# Patient Record
Sex: FEMALE | Race: WHITE | NOT HISPANIC OR LATINO | Employment: FULL TIME | URBAN - METROPOLITAN AREA
[De-identification: names, ages, dates, MRNs, and addresses within clinical notes are randomized per-mention and may not be internally consistent; named-entity substitution may affect disease eponyms.]

---

## 2017-05-25 ENCOUNTER — TRANSCRIBE ORDERS (OUTPATIENT)
Dept: ADMINISTRATIVE | Facility: HOSPITAL | Age: 52
End: 2017-05-25

## 2017-05-25 DIAGNOSIS — Z12.31 VISIT FOR SCREENING MAMMOGRAM: Primary | ICD-10-CM

## 2017-06-05 ENCOUNTER — HOSPITAL ENCOUNTER (OUTPATIENT)
Dept: MAMMOGRAPHY | Facility: HOSPITAL | Age: 52
Discharge: HOME | End: 2017-06-05
Attending: OBSTETRICS & GYNECOLOGY | Admitting: OBSTETRICS & GYNECOLOGY

## 2017-06-05 ENCOUNTER — APPOINTMENT (OUTPATIENT)
Dept: OTHER | Facility: HOSPITAL | Age: 52
End: 2017-06-05
Attending: OBSTETRICS & GYNECOLOGY

## 2017-06-05 DIAGNOSIS — Z92.89 H/O MAMMOGRAM: ICD-10-CM

## 2017-06-05 DIAGNOSIS — Z12.31 VISIT FOR SCREENING MAMMOGRAM: ICD-10-CM

## 2017-06-05 PROCEDURE — 77067 SCR MAMMO BI INCL CAD: CPT | Performed by: RADIOLOGY

## 2017-06-05 PROCEDURE — 77063 BREAST TOMOSYNTHESIS BI: CPT

## 2017-06-05 PROCEDURE — 77063 BREAST TOMOSYNTHESIS BI: CPT | Performed by: RADIOLOGY

## 2017-06-05 PROCEDURE — G0202 SCR MAMMO BI INCL CAD: HCPCS

## 2019-09-16 ENCOUNTER — HOSPITAL ENCOUNTER (EMERGENCY)
Facility: HOSPITAL | Age: 54
Discharge: HOME | End: 2019-09-16
Attending: EMERGENCY MEDICINE | Admitting: EMERGENCY MEDICINE

## 2019-09-16 VITALS
RESPIRATION RATE: 18 BRPM | SYSTOLIC BLOOD PRESSURE: 143 MMHG | DIASTOLIC BLOOD PRESSURE: 79 MMHG | HEIGHT: 66 IN | BODY MASS INDEX: 28.9 KG/M2 | TEMPERATURE: 98.2 F | OXYGEN SATURATION: 96 % | WEIGHT: 179.8 LBS | HEART RATE: 76 BPM

## 2019-09-16 DIAGNOSIS — S91.119A LACERATION OF TOE OF RIGHT FOOT WITHOUT FOREIGN BODY PRESENT OR DAMAGE TO NAIL, UNSPECIFIED TOE, INITIAL ENCOUNTER: Primary | ICD-10-CM

## 2019-09-16 PROCEDURE — 99282 EMERGENCY DEPT VISIT SF MDM: CPT

## 2021-01-15 ENCOUNTER — OFFICE VISIT (OUTPATIENT)
Dept: INTERNAL MEDICINE | Facility: CLINIC | Age: 56
End: 2021-01-15

## 2021-01-15 VITALS
TEMPERATURE: 97.8 F | HEART RATE: 72 BPM | HEIGHT: 66 IN | DIASTOLIC BLOOD PRESSURE: 72 MMHG | SYSTOLIC BLOOD PRESSURE: 118 MMHG | WEIGHT: 185.8 LBS | OXYGEN SATURATION: 100 % | BODY MASS INDEX: 29.86 KG/M2

## 2021-01-15 DIAGNOSIS — Z13.29 SCREENING FOR ENDOCRINE, METABOLIC AND IMMUNITY DISORDER: ICD-10-CM

## 2021-01-15 DIAGNOSIS — E66.09 CLASS 1 OBESITY DUE TO EXCESS CALORIES WITHOUT SERIOUS COMORBIDITY WITH BODY MASS INDEX (BMI) OF 30.0 TO 30.9 IN ADULT: ICD-10-CM

## 2021-01-15 DIAGNOSIS — Z13.0 SCREENING FOR ENDOCRINE, METABOLIC AND IMMUNITY DISORDER: ICD-10-CM

## 2021-01-15 DIAGNOSIS — Z00.00 ANNUAL PHYSICAL EXAM: ICD-10-CM

## 2021-01-15 DIAGNOSIS — Z13.228 SCREENING FOR ENDOCRINE, METABOLIC AND IMMUNITY DISORDER: ICD-10-CM

## 2021-01-15 DIAGNOSIS — Z11.59 ENCOUNTER FOR HEPATITIS C VIRUS SCREENING TEST FOR HIGH RISK PATIENT: ICD-10-CM

## 2021-01-15 DIAGNOSIS — M54.41 CHRONIC BILATERAL LOW BACK PAIN WITH RIGHT-SIDED SCIATICA: ICD-10-CM

## 2021-01-15 DIAGNOSIS — E78.5 HYPERLIPIDEMIA, UNSPECIFIED HYPERLIPIDEMIA TYPE: ICD-10-CM

## 2021-01-15 DIAGNOSIS — Z13.0 SCREENING FOR DISORDER OF BLOOD AND BLOOD-FORMING ORGANS: ICD-10-CM

## 2021-01-15 DIAGNOSIS — Z76.89 ENCOUNTER TO ESTABLISH CARE: Primary | ICD-10-CM

## 2021-01-15 DIAGNOSIS — G89.29 CHRONIC BILATERAL LOW BACK PAIN WITH RIGHT-SIDED SCIATICA: ICD-10-CM

## 2021-01-15 DIAGNOSIS — D68.59 PROTEIN C DEFICIENCY (HCC): ICD-10-CM

## 2021-01-15 DIAGNOSIS — Z91.89 ENCOUNTER FOR HEPATITIS C VIRUS SCREENING TEST FOR HIGH RISK PATIENT: ICD-10-CM

## 2021-01-15 PROCEDURE — 99386 PREV VISIT NEW AGE 40-64: CPT | Performed by: NURSE PRACTITIONER

## 2021-01-15 RX ORDER — IBUPROFEN 600 MG/1
600 TABLET ORAL EVERY 6 HOURS PRN
Qty: 90 TABLET | Refills: 1 | Status: SHIPPED | OUTPATIENT
Start: 2021-01-15 | End: 2021-01-15 | Stop reason: SDUPTHER

## 2021-01-15 RX ORDER — IBUPROFEN 600 MG/1
600 TABLET ORAL EVERY 6 HOURS PRN
Qty: 90 TABLET | Refills: 1 | OUTPATIENT
Start: 2021-01-15 | End: 2022-04-28

## 2021-01-16 LAB
ALBUMIN SERPL-MCNC: 4.3 G/DL (ref 3.5–5.2)
ALBUMIN/GLOB SERPL: 1.9 G/DL
ALP SERPL-CCNC: 46 U/L (ref 39–117)
ALT SERPL-CCNC: 28 U/L (ref 1–33)
AST SERPL-CCNC: 24 U/L (ref 1–32)
BASOPHILS # BLD AUTO: 0.02 10*3/MM3 (ref 0–0.2)
BASOPHILS NFR BLD AUTO: 0.5 % (ref 0–1.5)
BILIRUB SERPL-MCNC: 0.5 MG/DL (ref 0–1.2)
BUN SERPL-MCNC: 13 MG/DL (ref 6–20)
BUN/CREAT SERPL: 18.1 (ref 7–25)
CALCIUM SERPL-MCNC: 9.2 MG/DL (ref 8.6–10.5)
CHLORIDE SERPL-SCNC: 101 MMOL/L (ref 98–107)
CHOLEST SERPL-MCNC: 282 MG/DL (ref 0–200)
CO2 SERPL-SCNC: 28.8 MMOL/L (ref 22–29)
CREAT SERPL-MCNC: 0.72 MG/DL (ref 0.57–1)
EOSINOPHIL # BLD AUTO: 0.07 10*3/MM3 (ref 0–0.4)
EOSINOPHIL NFR BLD AUTO: 1.9 % (ref 0.3–6.2)
ERYTHROCYTE [DISTWIDTH] IN BLOOD BY AUTOMATED COUNT: 12.4 % (ref 12.3–15.4)
GLOBULIN SER CALC-MCNC: 2.3 GM/DL
GLUCOSE SERPL-MCNC: 86 MG/DL (ref 65–99)
HCT VFR BLD AUTO: 42.9 % (ref 34–46.6)
HCV AB S/CO SERPL IA: <0.1 S/CO RATIO (ref 0–0.9)
HDLC SERPL-MCNC: 83 MG/DL (ref 40–60)
HGB BLD-MCNC: 14.6 G/DL (ref 12–15.9)
IMM GRANULOCYTES # BLD AUTO: 0.01 10*3/MM3 (ref 0–0.05)
IMM GRANULOCYTES NFR BLD AUTO: 0.3 % (ref 0–0.5)
LDLC SERPL CALC-MCNC: 186 MG/DL (ref 0–100)
LYMPHOCYTES # BLD AUTO: 1.25 10*3/MM3 (ref 0.7–3.1)
LYMPHOCYTES NFR BLD AUTO: 33.3 % (ref 19.6–45.3)
MCH RBC QN AUTO: 31.5 PG (ref 26.6–33)
MCHC RBC AUTO-ENTMCNC: 34 G/DL (ref 31.5–35.7)
MCV RBC AUTO: 92.5 FL (ref 79–97)
MONOCYTES # BLD AUTO: 0.38 10*3/MM3 (ref 0.1–0.9)
MONOCYTES NFR BLD AUTO: 10.1 % (ref 5–12)
NEUTROPHILS # BLD AUTO: 2.02 10*3/MM3 (ref 1.7–7)
NEUTROPHILS NFR BLD AUTO: 53.9 % (ref 42.7–76)
NRBC BLD AUTO-RTO: 0 /100 WBC (ref 0–0.2)
PLATELET # BLD AUTO: 240 10*3/MM3 (ref 140–450)
POTASSIUM SERPL-SCNC: 4.7 MMOL/L (ref 3.5–5.2)
PROT SERPL-MCNC: 6.6 G/DL (ref 6–8.5)
RBC # BLD AUTO: 4.64 10*6/MM3 (ref 3.77–5.28)
SODIUM SERPL-SCNC: 139 MMOL/L (ref 136–145)
TRIGL SERPL-MCNC: 79 MG/DL (ref 0–150)
TSH SERPL DL<=0.005 MIU/L-ACNC: 1.84 UIU/ML (ref 0.27–4.2)
VLDLC SERPL CALC-MCNC: 13 MG/DL (ref 5–40)
WBC # BLD AUTO: 3.75 10*3/MM3 (ref 3.4–10.8)

## 2021-01-16 NOTE — PROGRESS NOTES
Please inform patient labs are normal, with exception of lipid panel.  Her 10-year ASCVD risk score  is: 1.8%.  Medication therapy is usually initiated when the score is over 7.  She should eat a low cholesterol, low fat diet.  Be physically active most days of the week we discussed.

## 2021-01-19 ENCOUNTER — TELEPHONE (OUTPATIENT)
Dept: INTERNAL MEDICINE | Facility: CLINIC | Age: 56
End: 2021-01-19

## 2021-01-19 DIAGNOSIS — R23.9 UNSPECIFIED SKIN CHANGES: Primary | ICD-10-CM

## 2021-01-19 NOTE — TELEPHONE ENCOUNTER
Caller: Angie Becker    Relationship: Self    Best call back number: [phone]    What orders are you requesting (i.e. lab or imaging): REFERRAL TO A [facility] DERMATOLOGIST IN [place]    In what timeframe would the patient need to come in:     Where will you receive your lab/imaging services:     Additional notes: Patient stated that she had a dermatologist but he was not with the [facility] and she prefers to stay in the [facility] in [place].

## 2021-01-19 NOTE — TELEPHONE ENCOUNTER
Patient called back and stated that she would like to be referred to a dermatologist in [place].    Patient callback: [phone]    Please advise

## 2021-01-19 NOTE — TELEPHONE ENCOUNTER
Called, left voicemail that we do not have [facility] in [place] or [place].  We do have affiliated providers. She will contact clinic if she wants to be referred to someone in [place].

## 2021-03-11 NOTE — PROGRESS NOTES
Date: 01/15/2021    Name: Angie Becker  : 1965    Chief Complaint:   Chief Complaint   Patient presents with   • [facility]     physical       HPI:  Angie Becker is a 55 y.o. female presents to establish care, obtain annual physical & labs for work. She has a h/o hyperlipids, does not currently take medication.  Tries to eat a heart healthy diet.  Walks, uses home workout equipment 4 days a week.    Takes tylenol pm at night.  Wakes up with headache, when she doesn't take it.  Feels it is due to sinuses, no allergies.  Has chronic lower back pain that radiates into posterior right buttock and thigh.  Tylenol PM helps with lower back pain in the mornings as well.     has recently been diagnosed with stage IV prostate cancer which has metastasized.    She reports she had a heart attack when she was given epinephrine in the past.  Does not currently see cardiology, does not take medication for coronary artery disease.  She has a history of protein C deficiency.     History:  LMP: No LMP recorded. Patient is postmenopausal.  Menopause at 50 years  Last pap date: 20, she sees gynecology  Abnormal pap? no  : 4  Para: 3    Do you take any herbs or supplements that were not prescribed by a doctor? yes, vitamin D, vitamin C, zinc      Health Habits:  Dental Exam. not up to date - not comfortable scheduling dental exam at this time due to COVID  Eye Exam. up to date, wears glasses  Exercise: 4 times/week.  Current exercise activities include: walking and stationary bike     History:    Past Medical History:   Diagnosis Date   • Myocardial infarction (CMS/HCC)        Past Surgical History:   Procedure Laterality Date   •  SECTION     • VASCULAR SURGERY         Family History   Problem Relation Age of Onset   • Diabetes Mother    • Cancer Mother         uterine   • Hypertension Mother    • Diabetes Father    • Heart attack Father    • Stroke Father        Social History  "    Socioeconomic History   • Marital status:      Spouse name: Not on file   • Number of children: Not on file   • Years of education: Not on file   • Highest education level: Not on file   Tobacco Use   • Smoking status: Never Smoker   • Smokeless tobacco: Never Used   Substance and Sexual Activity   • Alcohol use: Yes     Comment: 1-2 times weekly   • Drug use: No   • Sexual activity: Defer       Allergies   Allergen Reactions   • Epinephrine Other (See Comments)     \"heart attack\"         Current Outpatient Medications:   •  ibuprofen (ADVIL,MOTRIN) 600 MG tablet, Take 1 tablet by mouth Every 6 (Six) Hours As Needed for Mild Pain ., Disp: 90 tablet, Rfl: 1      VS:  Vitals:    01/15/21 0907   BP: 118/72   Pulse: 72   Temp: 97.8 °F (36.6 °C)   TempSrc: Infrared   SpO2: 100%   Weight: 84.3 kg (185 lb 12.8 oz)   Height: 167.6 cm (66\")     Body mass index is 29.99 kg/m².    PE:  Physical Exam  Constitutional:       Appearance: She is well-developed. She is not ill-appearing.   HENT:      Head: Normocephalic.      Right Ear: Tympanic membrane, ear canal and external ear normal.      Left Ear: Tympanic membrane, ear canal and external ear normal.      Nose: Nose normal.      Mouth/Throat:      Mouth: Mucous membranes are moist.      Pharynx: Oropharynx is clear. Uvula midline.   Eyes:      Extraocular Movements: Extraocular movements intact.      Conjunctiva/sclera: Conjunctivae normal.      Pupils: Pupils are equal, round, and reactive to light.   Neck:      Musculoskeletal: Full passive range of motion without pain, normal range of motion and neck supple.      Thyroid: No thyromegaly.      Vascular: No carotid bruit.   Cardiovascular:      Rate and Rhythm: Normal rate and regular rhythm.      Pulses: Normal pulses.      Heart sounds: Normal heart sounds.   Pulmonary:      Effort: Pulmonary effort is normal.      Breath sounds: Normal breath sounds.   Abdominal:      General: Bowel sounds are normal.      " Palpations: Abdomen is soft.      Tenderness: There is no abdominal tenderness.   Musculoskeletal: Normal range of motion.         General: No tenderness or deformity.   Lymphadenopathy:      Cervical: No cervical adenopathy.   Skin:     General: Skin is warm.      Capillary Refill: Capillary refill takes less than 2 seconds.   Neurological:      Mental Status: She is alert and oriented to person, place, and time.      Sensory: No sensory deficit.      Coordination: Coordination normal.      Gait: Gait normal.      Comments: CN II-XII normal   Psychiatric:         Attention and Perception: Attention normal.         Mood and Affect: Mood and affect normal.         Speech: Speech normal.         Behavior: Behavior normal.         Thought Content: Thought content normal.         Assessment/Plan:     1. Healthy female exam.    2. Patient Counseling: Including but not Limited to the following, when appropriate:  --Nutrition: Stressed importance of moderation in sodium/caffeine intake, saturated fat and cholesterol, caloric balance, sufficient intake of fresh fruits, vegetables, fiber, calcium, iron, and 1 mg of folate supplement per day (for females capable of pregnancy).  --Exercise: Stressed the importance of regular exercise.   --Substance Abuse: Discussed cessation/primary prevention of tobacco, alcohol, or other drug use; driving or other dangerous activities under the influence; availability of treatment for abuse, as indicated based on social history.    --Sexuality: Discussed sexually transmitted diseases, partner selection, use of condoms, avoidance of unintended pregnancy  and contraceptive alternatives.   --Injury prevention: Discussed safety belts, safety helmets, smoke detector, smoking near bedding or upholstery.   --Dental health: Discussed importance of regular tooth brushing, flossing, and dental visits.  --Immunizations reviewed.  --Discussed benefits of colon cancer screening.  3. Discussed the  patient's BMI with her.  The BMI is above average; BMI management plan is completed  4. Return in about 1 year (around 1/15/2022) for Annual.  5. Age-appropriate Screening Scheduled  6. There are no Patient Instructions on file for this visit.    Diagnoses and all orders for this visit:    1. Encounter to establish care (Primary)    2. Annual physical exam    3. Protein C deficiency (CMS/HCC)    4. Chronic bilateral low back pain with right-sided sciatica  -     Ambulatory Referral to Physical Therapy Evaluate and treat; Heat; Moist heat, Ultrasound; Soft Tissue Mobilizaton; Stretching, Strengthening; Full weight bearing  -     ibuprofen (ADVIL,MOTRIN) 600 MG tablet; Take 1 tablet by mouth Every 6 (Six) Hours As Needed for Mild Pain .  Dispense: 90 tablet; Refill: 1  - Stretch before and after workouts.    5. Hyperlipidemia, unspecified hyperlipidemia type  -     Lipid Panel  - Heart healthy diet, discussed Mediterranean diet specifically    6. Screening for disorder of blood and blood-forming organs  -     Comprehensive Metabolic Panel    7. Screening for endocrine, metabolic and immunity disorder  -     CBC & Differential  -     TSH    8. Encounter for hepatitis C virus screening test for high risk patient  -     Hepatitis C Antibody    9. Class 1 obesity due to excess calories without serious comorbidity with body mass index (BMI) of 30.0 to 30.9 in adult        - Patient's Body mass index is 29.99 kg/m². BMI is above normal parameters. Recommendations include: exercise counseling and nutrition counseling.    Discussed 's prognosis.  She feels she and her  are dealing with it well.  Will reach out, return to clinic should she feel she is becoming depressed or anxious.    Return in about 1 year (around 1/15/2022) for Annual.                   yes

## 2021-03-19 ENCOUNTER — TREATMENT (OUTPATIENT)
Dept: PHYSICAL THERAPY | Facility: CLINIC | Age: 56
End: 2021-03-19

## 2021-03-19 DIAGNOSIS — S39.012S STRAIN OF LUMBAR REGION, SEQUELA: Primary | ICD-10-CM

## 2021-03-19 PROCEDURE — 97530 THERAPEUTIC ACTIVITIES: CPT | Performed by: PHYSICAL THERAPIST

## 2021-03-19 PROCEDURE — 97110 THERAPEUTIC EXERCISES: CPT | Performed by: PHYSICAL THERAPIST

## 2021-03-19 PROCEDURE — 97161 PT EVAL LOW COMPLEX 20 MIN: CPT | Performed by: PHYSICAL THERAPIST

## 2021-03-19 NOTE — PROGRESS NOTES
Physical Therapy Initial Evaluation and Plan of Care      Patient: Angie Becker   : 1965  Diagnosis/ICD-10 Code:  Strain of lumbar region, sequela [S39.012S]  Referring practitioner: FELIPE Garcia*    Subjective Evaluation    History of Present Illness  Mechanism of injury: She bent down and felt a pop in the back 2020.  Pain in the back and down the leg.      L Low back pain and L LE pain.  Walking makes it worse.  Increased speed increases her Leg pain.        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   Pain  Current pain ratin  At best pain ratin  At worst pain ratin (walking this week. )  Location: L Lumbar spine and hip/leg.    Aggravating factors: ambulation, prolonged positioning and repetitive movement (L lumbar spine and hip. )    Patient Goals  Patient goals for therapy: return to sport/leisure activities, increased strength, increased motion and decreased pain             Objective          Static Posture     Lumbar Spine   Flattened.   Lumbar spine (Right): Shifted.     Postural Observations    Additional Postural Observation Details  Shift correction completely elevated sxs in the L foot 4/10       Tenderness     Lumbar Spine  Tenderness in the spinous process (L5-S1).     Left Hip   Tenderness in the PSIS.     Active Range of Motion     Lumbar   Flexion: WFL and with pain  Extension: WFL  Left lateral flexion: with pain  Right lateral flexion: with pain    Strength/Myotome Testing     Left Hip   Planes of Motion   Abduction: 4+  External rotation: 4+    Tests     Lumbar   Positive repeated extension (increased peripherial sxs in the L LE noted) and repeated flexion (reduce LE sxs).     Left   Positive passive SLR (minimal elevated on the L LE for hip and HS area sx).     Additional Tests Details  PPU elevated the L LE sxs consistently.                Assessment & Plan     Assessment  Impairments: abnormal muscle tone,  abnormal or restricted ROM, activity intolerance, lacks appropriate home exercise program and pain with function  Assessment details: Patient is a 55 year old female who comes to physical therapy with chronic low back pain. Signs and symptoms are consistent with lumbar radiculopathy with slight trunk shift.  She is responding today to unloaded flexion activity. Trial of shift correction today elevated her leg sxs.  The patient currently has pain, decreased ROM, decreased strength, and inability to perform all essential functional activities. Pt will benefit from skilled PT services to address the above issues.     Prognosis: fair  Functional Limitations: carrying objects, lifting, pulling, pushing, uncomfortable because of pain, sitting, standing, stooping and unable to perform repetitive tasks  Goals  Plan Goals: SHORT TERM GOALS:     2 weeks  1. Pt independent with HEP  2. Pt to demonstrate trunk AROM 50-75% of expected norms to allow for improved ability to perform ADL's  3. Pt to demonstrate bilateral hip strength 4/5 in all planes to improved stability of the core/trunk     LONG TERM GOALS:   6 weeks  1. Pt to demonstrate trunk AROM % of expected norms to allow for improved ability to perform functional activities  2. Pt to demonstrate ability to perform full functional squat with good form and without increased pain in the low back   3. Pt to report being able to work full shift or work in the home without increase in pain in the back  4. Pt to report cessation of pain/numbness/tingling into the left leg to show decreased nerve compression      Plan  Therapy options: will be seen for skilled physical therapy services  Planned modality interventions: cryotherapy, thermotherapy (hydrocollator packs) and electrical stimulation/Russian stimulation  Planned therapy interventions: abdominal trunk stabilization, manual therapy, spinal/joint mobilization, soft tissue mobilization, strengthening, stretching,  therapeutic activities, functional ROM exercises, flexibility, body mechanics training, neuromuscular re-education and postural training  Duration in visits: 10  Treatment plan discussed with: patient  Plan details: Pt will be seen 2-3 x / week.  Assess Pt response to PREP, posture and body mechanics.         Manual Therapy:         mins  28910;  Therapeutic Exercise:    13     mins  78970;     Neuromuscular Sandeep:        mins  22343;    Therapeutic Activity:     10     mins  63009;     Gait Training:           mins  28918;     Ultrasound:          mins  15505;    Electrical Stimulation:         mins  03486 ( );  Dry Needling          mins self-pay    Timed Treatment:   23   mins   Total Treatment:     50   mins    PT SIGNATURE: Jewel Angulo, PT   DATE TREATMENT INITIATED: 3/19/2021    Initial Certification  Certification Period: 6/17/2021  I certify that the therapy services are furnished while this patient is under my care.  The services outlined above are required by this patient, and will be reviewed every 90 days.     PHYSICIAN: Aline Hassan APRN      DATE:     Please sign and return via fax to  .. Thank you, [facility].

## 2021-03-22 ENCOUNTER — TREATMENT (OUTPATIENT)
Dept: PHYSICAL THERAPY | Facility: CLINIC | Age: 56
End: 2021-03-22

## 2021-03-22 DIAGNOSIS — S39.012S STRAIN OF LUMBAR REGION, SEQUELA: Primary | ICD-10-CM

## 2021-03-22 PROCEDURE — 97140 MANUAL THERAPY 1/> REGIONS: CPT | Performed by: PHYSICAL THERAPIST

## 2021-03-22 PROCEDURE — 97530 THERAPEUTIC ACTIVITIES: CPT | Performed by: PHYSICAL THERAPIST

## 2021-03-22 PROCEDURE — 97110 THERAPEUTIC EXERCISES: CPT | Performed by: PHYSICAL THERAPIST

## 2021-03-22 NOTE — PROGRESS NOTES
Physical Therapy Daily Progress Note    Patient Information  Angie Becker  1965      Visit # : 2    Angie Becker reports 0/10 pain today sitting at rest.  Pt with pain getting ready this morning with 5/10 pain in the L post knee.   Pt with back flare up this weekend after walking down hill.   Pt with elevated pain and the exercises did not help.    The recliner seemed to help more than anything.         Objective Pt presents to PT today with no distress noted.     Lumbar spine L4-L5-S1 is limited in lumbar flexion mobility and she has no hip or LE sxs during the activity.  Post activity she has slightly more burning in the hip.    Prone lying with manual distraction is + for complete relief of all sxs .      Pt educated on TA activation.     See Exercise, Manual, and Modality Logs for complete treatment.     Assessment/Plan  Pt may respond to prone lying alone better then unloaded flexion.  We need more time her to follow up with the sxs.       Progress per Plan of Care and Progress strengthening /stabilization /functional activity      Visit Diagnoses:    ICD-10-CM ICD-9-CM   1. Strain of lumbar region, sequela  S39.012S 905.7            Manual Therapy:    11     mins  19227;  Therapeutic Exercise:    26     mins  40826;     Neuromuscular Sandeep:        mins  88948;    Therapeutic Activity:     13     mins  09776;     Gait Training:           mins  03879;     Ultrasound:          mins  93658;    Electrical Stimulation:         mins  75691 ( );  Dry Needling          mins self-pay    Timed Treatment:   50   mins   Total Treatment:     50   mins          Jewel Angulo, PT  Physical Therapist

## 2021-04-05 ENCOUNTER — TREATMENT (OUTPATIENT)
Dept: PHYSICAL THERAPY | Facility: CLINIC | Age: 56
End: 2021-04-05

## 2021-04-05 DIAGNOSIS — S39.012S STRAIN OF LUMBAR REGION, SEQUELA: Primary | ICD-10-CM

## 2021-04-05 PROCEDURE — 97530 THERAPEUTIC ACTIVITIES: CPT | Performed by: PHYSICAL THERAPIST

## 2021-04-05 PROCEDURE — 97110 THERAPEUTIC EXERCISES: CPT | Performed by: PHYSICAL THERAPIST

## 2021-04-05 PROCEDURE — 97140 MANUAL THERAPY 1/> REGIONS: CPT | Performed by: PHYSICAL THERAPIST

## 2021-04-05 PROCEDURE — 97035 APP MDLTY 1+ULTRASOUND EA 15: CPT | Performed by: PHYSICAL THERAPIST

## 2021-04-05 NOTE — PROGRESS NOTES
Physical Therapy Daily Progress Note    Patient Information  Angie Becker  1965      Visit # : 3    Angie Becker reports 0/10 pain today at rest.  Pt reports she was doing better until she picked up grandchild and then tie shoe during a walk.     No pain in the AM.  Shifting from side to side hurts a little.     Objective Pt presents to PT today with no distress noted at rest.     Pt with improved relief from prone lying.    DANIA activity seemed to elevate pressure in the L Lumbar spine and made her shift in stance.     See Exercise, Manual, and Modality Logs for complete treatment.     Assessment/Plan  Pt may have a spondylolisthesis that is causing her to be more difficult in responding to one direction or the other.  She definitely does not do well with extension but flexion at end ranges also seems to hurt.        Progress per Plan of Care and Progress strengthening /stabilization /functional activity  Check response to US and prone over pillow.     Visit Diagnoses:    ICD-10-CM ICD-9-CM   1. Strain of lumbar region, sequela  S39.012S 905.7            Manual Therapy:    11     mins  42373;  Therapeutic Exercise:    19     mins  49723;     Neuromuscular Sandeep:        mins  38081;    Therapeutic Activity:     13     mins  41217;     Gait Training:           mins  42063;     Ultrasound:     10     mins  59687;    Electrical Stimulation:         mins  01046 ( );  Dry Needling          mins self-pay    Timed Treatment:   53   mins   Total Treatment:     53   mins          Jewel Angulo, PT  Physical Therapist

## 2022-05-11 ENCOUNTER — TREATMENT (OUTPATIENT)
Dept: PHYSICAL THERAPY | Facility: CLINIC | Age: 57
End: 2022-05-11

## 2022-05-11 DIAGNOSIS — M54.42 CHRONIC LEFT-SIDED LOW BACK PAIN WITH LEFT-SIDED SCIATICA: Primary | ICD-10-CM

## 2022-05-11 DIAGNOSIS — G89.29 CHRONIC LEFT-SIDED LOW BACK PAIN WITH LEFT-SIDED SCIATICA: Primary | ICD-10-CM

## 2022-05-11 PROCEDURE — 97110 THERAPEUTIC EXERCISES: CPT | Performed by: PHYSICAL THERAPIST

## 2022-05-11 PROCEDURE — 97530 THERAPEUTIC ACTIVITIES: CPT | Performed by: PHYSICAL THERAPIST

## 2022-05-11 PROCEDURE — 97140 MANUAL THERAPY 1/> REGIONS: CPT | Performed by: PHYSICAL THERAPIST

## 2022-05-11 PROCEDURE — 97161 PT EVAL LOW COMPLEX 20 MIN: CPT | Performed by: PHYSICAL THERAPIST

## 2022-05-11 NOTE — PROGRESS NOTES
Physical Therapy Initial Evaluation and Plan of Care      Patient: Angie Becker   : 1965  Diagnosis/ICD-10 Code:  Chronic left-sided low back pain with left-sided sciatica [M54.42, G89.29]  Referring practitioner: Ammon Elliott Jr.*    Subjective Evaluation    History of Present Illness  Mechanism of injury: Patient reports that she has had pain in her low back for years. She states that in the last year her back pain has really worsened. She states that if she stands too long she has pain. She states that sitting is tolerable. She is able to walk but not long distances. Patient reports that she travels for work. Her job requires her to stand for long periods of time. She is having a difficult time standing without rest breaks or leaning against something.    Patient states that she has numbness and tingling into L LE. It goes down the back of her leg and into the side of her ankle.     She has difficulty sleeping at times due to her pain.       Patient Occupation: Sales  Pain  Current pain ratin  At best pain ratin  At worst pain ratin  Quality: burning, dull ache, sharp and throbbing  Relieving factors: medications (Ibuprofen as needed)  Aggravating factors: ambulation, lifting, stairs, repetitive movement, movement, standing and sleeping  Progression: worsening    Social Support  Lives with: spouse    Diagnostic Tests  X-ray: abnormal (anterolisthesis of L4 on L5, facet joint arthropathy)    Treatments  Previous treatment: injection treatment (relief for short period of time)  Patient Goals  Patient goals for therapy: decreased pain, increased motion, increased strength, independence with ADLs/IADLs and return to work             Objective        Special Questions      Additional Special Questions  No red flags noted.       Postural Observations  Seated posture: fair  Standing posture: good    Additional Postural Observation Details  Slightly forward flexed posture noted in  sitting.     Palpation   Left   Tenderness of the erector spinae and lumbar paraspinals.     Additional Palpation Details  Tenderness also noted in L glute max and piriformis musculature.     Neurological Testing     Sensation     Lumbar   Left   Intact: light touch    Right   Intact: light touch    Reflexes   Left   Patellar (L4): trace (1+)  Achilles (S1): trace (1+)    Right   Patellar (L4): trace (1+)  Achilles (S1): trace (1+)    Additional Neurological Details  Slightly diminished along dermatomal pattern of L5/S1 of L LE.     Active Range of Motion     Lumbar   Flexion: 64 degrees   Extension: 16 degrees     Strength/Myotome Testing     Left Hip   Planes of Motion   Flexion: 4-  Abduction: 4-  Adduction: 4    Right Hip   Planes of Motion   Flexion: 4+  Abduction: 4+  Adduction: 4+    Left Knee   Flexion: 4+  Extension: 4+    Right Knee   Flexion: 4+  Extension: 4+    Left Ankle/Foot   Dorsiflexion: 4+  Plantar flexion: 4+    Right Ankle/Foot   Dorsiflexion: 4+  Plantar flexion: 4+    Tests       Thoracic   Negative slump.     Lumbar     Left   Positive passive SLR.   Negative crossed SLR.     Right   Negative crossed SLR and passive SLR.     Additional Tests Details  Repeated lumbar extension peripheralized symptoms.     SKTC centralized symptoms.     Core weakness noted. Patient demonstrated difficulty maintaining TA contraction.      General Comments     Lumbar Comments  Patient given HEP to assist with noted deficits.     Diagnosis/prognosis reviewed with patients.            Assessment & Plan     Assessment  Impairments: abnormal or restricted ROM, activity intolerance, impaired physical strength, lacks appropriate home exercise program, pain with function and weight-bearing intolerance  Functional Limitations: carrying objects, lifting, sleeping, walking, pulling, pushing, uncomfortable because of pain, standing, stooping and unable to perform repetitive tasks  Assessment details: Patient is a 57 year  old female who comes to physical therapy with complaints of chronic low back pain. Signs and symptoms are consistent with lumbar radiculopathy. She had centralized L LE symptoms with alternating SKTC. The patient currently has pain, decreased strength, and inability to perform many essential functional activities. Patient given HEP to assist with noted deficits. Pt will benefit from skilled PT services to address the above issues.       Goals  Plan Goals: SHORT TERM GOALS:     2 weeks  1. Pt independent with HEP    3. Pt to demonstrate bilateral hip strength 4/5 in all planes to improved stability of the core/trunk     LONG TERM GOALS:   6 weeks  1. Pt to demonstrate ability to perform full functional squat with good form and without increased pain in the low back   2. Pt to report being able to work full shift or work in the home without increase in pain in the back  3. Pt to report cessation of pain/numbness/tingling into the left leg to show decreased nerve compression        Plan  Therapy options: will be seen for skilled therapy services  Planned modality interventions: cryotherapy, dry needling and ultrasound  Planned therapy interventions: abdominal trunk stabilization, body mechanics training, flexibility, functional ROM exercises, home exercise program, joint mobilization, therapeutic activities, stretching, strengthening, spinal/joint mobilization, soft tissue mobilization, neuromuscular re-education and manual therapy  Frequency: 2x week  Duration in weeks: 6  Treatment plan discussed with: patient        Manual Therapy:    12     mins  96262;  Therapeutic Exercise:    10     mins  99557;     Neuromuscular Sandeep:        mins  36443;    Therapeutic Activity:     18     mins  61329;     Gait Training:           mins  96427;     Ultrasound:          mins  47837;    Electrical Stimulation:         mins  62293 ( );  Dry Needling          mins self-pay    Timed Treatment:   40   mins   Total Treatment:      64   mins    PT SIGNATURE: Rebeka Melendez, PT   [place] License: [id number]  DATE TREATMENT INITIATED: 5/11/2022    Initial Certification  Certification Period: 8/8/2022  I certify that the therapy services are furnished while this patient is under my care.  The services outlined above are required by this patient, and will be reviewed every 90 days.     PHYSICIAN: Ammon Elliott Jr., APRN      DATE:     Please sign and return via fax to [phone].. Thank you, [facility].

## 2023-01-25 ENCOUNTER — TRANSCRIBE ORDERS (OUTPATIENT)
Dept: ADMINISTRATIVE | Facility: HOSPITAL | Age: 58
End: 2023-01-25
Payer: COMMERCIAL

## 2023-01-25 DIAGNOSIS — Z12.31 SCREENING MAMMOGRAM FOR BREAST CANCER: Primary | ICD-10-CM

## 2023-03-20 ENCOUNTER — HOSPITAL ENCOUNTER (OUTPATIENT)
Dept: MAMMOGRAPHY | Facility: HOSPITAL | Age: 58
Discharge: HOME | End: 2023-03-20
Admitting: OBSTETRICS & GYNECOLOGY
Payer: COMMERCIAL

## 2023-03-20 DIAGNOSIS — Z12.31 SCREENING MAMMOGRAM FOR BREAST CANCER: ICD-10-CM

## 2023-03-20 PROCEDURE — 77067 SCR MAMMO BI INCL CAD: CPT | Performed by: RADIOLOGY

## 2023-03-20 PROCEDURE — 77063 BREAST TOMOSYNTHESIS BI: CPT | Performed by: RADIOLOGY

## 2023-03-20 PROCEDURE — 77067 SCR MAMMO BI INCL CAD: CPT

## 2023-03-20 PROCEDURE — 77063 BREAST TOMOSYNTHESIS BI: CPT

## 2023-04-06 ENCOUNTER — HOSPITAL ENCOUNTER (OUTPATIENT)
Dept: MAMMOGRAPHY | Facility: HOSPITAL | Age: 58
Discharge: HOME | End: 2023-04-06
Payer: COMMERCIAL

## 2023-04-06 DIAGNOSIS — Z12.31 VISIT FOR SCREENING MAMMOGRAM: ICD-10-CM

## 2024-03-01 ENCOUNTER — TRANSCRIBE ORDERS (OUTPATIENT)
Dept: ADMINISTRATIVE | Facility: HOSPITAL | Age: 59
End: 2024-03-01
Payer: COMMERCIAL

## 2024-03-01 DIAGNOSIS — Z12.31 SCREENING MAMMOGRAM FOR BREAST CANCER: Primary | ICD-10-CM

## 2024-04-11 ENCOUNTER — HOSPITAL ENCOUNTER (OUTPATIENT)
Dept: MAMMOGRAPHY | Facility: HOSPITAL | Age: 59
Discharge: HOME | End: 2024-04-11
Admitting: OBSTETRICS & GYNECOLOGY
Payer: COMMERCIAL

## 2024-04-11 DIAGNOSIS — Z12.31 SCREENING MAMMOGRAM FOR BREAST CANCER: ICD-10-CM

## 2024-04-11 PROCEDURE — 77063 BREAST TOMOSYNTHESIS BI: CPT

## 2024-04-11 PROCEDURE — 77067 SCR MAMMO BI INCL CAD: CPT

## 2024-10-23 ENCOUNTER — OFFICE VISIT (OUTPATIENT)
Dept: INTERNAL MEDICINE | Facility: CLINIC | Age: 59
End: 2024-10-23
Payer: COMMERCIAL

## 2024-10-23 VITALS
RESPIRATION RATE: 20 BRPM | HEIGHT: 66 IN | TEMPERATURE: 96.4 F | OXYGEN SATURATION: 98 % | DIASTOLIC BLOOD PRESSURE: 90 MMHG | SYSTOLIC BLOOD PRESSURE: 142 MMHG | WEIGHT: 188 LBS | HEART RATE: 51 BPM | BODY MASS INDEX: 30.22 KG/M2

## 2024-10-23 DIAGNOSIS — M75.112 NONTRAUMATIC INCOMPLETE TEAR OF LEFT ROTATOR CUFF: ICD-10-CM

## 2024-10-23 DIAGNOSIS — M25.512 CHRONIC LEFT SHOULDER PAIN: Primary | ICD-10-CM

## 2024-10-23 DIAGNOSIS — H65.413 CHRONIC ALLERGIC OTITIS MEDIA OF BOTH EARS: ICD-10-CM

## 2024-10-23 DIAGNOSIS — I25.2 HISTORY OF MI (MYOCARDIAL INFARCTION): ICD-10-CM

## 2024-10-23 DIAGNOSIS — G89.29 CHRONIC NONINTRACTABLE HEADACHE, UNSPECIFIED HEADACHE TYPE: ICD-10-CM

## 2024-10-23 DIAGNOSIS — R51.9 CHRONIC NONINTRACTABLE HEADACHE, UNSPECIFIED HEADACHE TYPE: ICD-10-CM

## 2024-10-23 DIAGNOSIS — R73.9 HYPERGLYCEMIA: ICD-10-CM

## 2024-10-23 DIAGNOSIS — E78.2 MIXED HYPERLIPIDEMIA: ICD-10-CM

## 2024-10-23 DIAGNOSIS — Z23 NEED FOR TDAP VACCINATION: ICD-10-CM

## 2024-10-23 DIAGNOSIS — G89.29 CHRONIC LEFT SHOULDER PAIN: Primary | ICD-10-CM

## 2024-10-23 DIAGNOSIS — Z71.3 WEIGHT LOSS COUNSELING, ENCOUNTER FOR: ICD-10-CM

## 2024-10-23 LAB
ALBUMIN SERPL-MCNC: 4.3 G/DL (ref 3.5–5.2)
ALBUMIN/GLOB SERPL: 2.5 G/DL
ALP SERPL-CCNC: 52 U/L (ref 39–117)
ALT SERPL-CCNC: 15 U/L (ref 1–33)
AST SERPL-CCNC: 16 U/L (ref 1–32)
BILIRUB SERPL-MCNC: 0.4 MG/DL (ref 0–1.2)
BUN SERPL-MCNC: 12 MG/DL (ref 8–23)
BUN/CREAT SERPL: 14.5 (ref 7–25)
CALCIUM SERPL-MCNC: 9.4 MG/DL (ref 8.6–10.5)
CHLORIDE SERPL-SCNC: 103 MMOL/L (ref 98–107)
CHOLEST SERPL-MCNC: 269 MG/DL (ref 0–200)
CO2 SERPL-SCNC: 30.5 MMOL/L (ref 22–29)
CREAT SERPL-MCNC: 0.83 MG/DL (ref 0.57–1)
EGFRCR SERPLBLD CKD-EPI 2021: 80.8 ML/MIN/1.73
ERYTHROCYTE [DISTWIDTH] IN BLOOD BY AUTOMATED COUNT: 12.2 % (ref 12.3–15.4)
GLOBULIN SER CALC-MCNC: 1.7 GM/DL
GLUCOSE SERPL-MCNC: 87 MG/DL (ref 65–99)
HBA1C MFR BLD: 5.1 % (ref 4.8–5.6)
HCT VFR BLD AUTO: 42.8 % (ref 34–46.6)
HDLC SERPL-MCNC: 80 MG/DL (ref 40–60)
HGB BLD-MCNC: 13.8 G/DL (ref 12–15.9)
LDLC SERPL CALC-MCNC: 175 MG/DL (ref 0–100)
MCH RBC QN AUTO: 29.8 PG (ref 26.6–33)
MCHC RBC AUTO-ENTMCNC: 32.2 G/DL (ref 31.5–35.7)
MCV RBC AUTO: 92.4 FL (ref 79–97)
PLATELET # BLD AUTO: 241 10*3/MM3 (ref 140–450)
POTASSIUM SERPL-SCNC: 4.8 MMOL/L (ref 3.5–5.2)
PROT SERPL-MCNC: 6 G/DL (ref 6–8.5)
RBC # BLD AUTO: 4.63 10*6/MM3 (ref 3.77–5.28)
SODIUM SERPL-SCNC: 140 MMOL/L (ref 136–145)
TRIGL SERPL-MCNC: 87 MG/DL (ref 0–150)
VLDLC SERPL CALC-MCNC: 14 MG/DL (ref 5–40)
WBC # BLD AUTO: 4.19 10*3/MM3 (ref 3.4–10.8)

## 2024-10-23 PROCEDURE — 90656 IIV3 VACC NO PRSV 0.5 ML IM: CPT | Performed by: STUDENT IN AN ORGANIZED HEALTH CARE EDUCATION/TRAINING PROGRAM

## 2024-10-23 PROCEDURE — 90472 IMMUNIZATION ADMIN EACH ADD: CPT | Performed by: STUDENT IN AN ORGANIZED HEALTH CARE EDUCATION/TRAINING PROGRAM

## 2024-10-23 PROCEDURE — 90715 TDAP VACCINE 7 YRS/> IM: CPT | Performed by: STUDENT IN AN ORGANIZED HEALTH CARE EDUCATION/TRAINING PROGRAM

## 2024-10-23 PROCEDURE — 99214 OFFICE O/P EST MOD 30 MIN: CPT | Performed by: STUDENT IN AN ORGANIZED HEALTH CARE EDUCATION/TRAINING PROGRAM

## 2024-10-23 PROCEDURE — 90471 IMMUNIZATION ADMIN: CPT | Performed by: STUDENT IN AN ORGANIZED HEALTH CARE EDUCATION/TRAINING PROGRAM

## 2024-10-23 RX ORDER — NAPROXEN 500 MG/1
500 TABLET ORAL 2 TIMES DAILY WITH MEALS
Qty: 180 TABLET | Refills: 0 | Status: SHIPPED | OUTPATIENT
Start: 2024-10-23

## 2024-10-23 RX ORDER — RIZATRIPTAN BENZOATE 5 MG/1
5 TABLET, ORALLY DISINTEGRATING ORAL ONCE AS NEEDED
Qty: 9 TABLET | Refills: 0 | Status: SHIPPED | OUTPATIENT
Start: 2024-10-23

## 2024-10-23 NOTE — PROGRESS NOTES
Subjective   Angie Becker is a 59 y.o. female.     History of Present Illness  Pt presents to establish care  Chronic left shoulder pain due to incomplete rotator cuff tear. See ortho for this and gets steroid injections which last for about 8 months she states  Pt with ear pain for the past 2 weeks. Worse at bed time and when laying down. Feels like pressure in ear. Has been taking sudafed which has helped  Has had left sided facial headaches without aura for years, at least 3 a month and can last up to 3 days at a time. Sees ent for this but they don't think it is due to sinuses and referred her to neuro. However, the neuro at  could not see her until February . Has only been taking tylenol and motrin and they aren't helpful  Pt interested in weight loss medication. Has been monitoring diet and exercise but has not been losing weight  Pt had mi in the past after epinephrine injections into vericose veins.      The following portions of the patient's history were reviewed and updated as appropriate: allergies, current medications, past family history, past medical history, past social history, past surgical history, and problem list.    Review of Systems   All other systems reviewed and are negative.      Objective   Physical Exam  Vitals and nursing note reviewed.   Constitutional:       Appearance: Normal appearance.   HENT:      Head: Normocephalic and atraumatic.      Right Ear: External ear normal.      Left Ear: External ear normal.      Nose: Nose normal.      Mouth/Throat:      Mouth: Mucous membranes are moist.      Pharynx: Oropharynx is clear. No oropharyngeal exudate or posterior oropharyngeal erythema.   Eyes:      Extraocular Movements: Extraocular movements intact.      Conjunctiva/sclera: Conjunctivae normal.      Pupils: Pupils are equal, round, and reactive to light.   Cardiovascular:      Rate and Rhythm: Normal rate and regular rhythm.      Pulses: Normal pulses.      Heart sounds:  Normal heart sounds.   Pulmonary:      Effort: Pulmonary effort is normal.   Abdominal:      General: Abdomen is flat. Bowel sounds are normal.      Palpations: Abdomen is soft.   Musculoskeletal:         General: Normal range of motion.      Cervical back: Normal range of motion.   Skin:     General: Skin is warm.      Capillary Refill: Capillary refill takes less than 2 seconds.   Neurological:      General: No focal deficit present.      Mental Status: She is alert and oriented to person, place, and time. Mental status is at baseline.   Psychiatric:         Mood and Affect: Mood normal.         Behavior: Behavior normal.         Thought Content: Thought content normal.         Judgment: Judgment normal.       Assessment & Plan   Diagnoses and all orders for this visit:    1. Chronic left shoulder pain (Primary)  -     naproxen (Naprosyn) 500 MG tablet; Take 1 tablet by mouth 2 (Two) Times a Day With Meals.  Dispense: 180 tablet; Refill: 0    2. Nontraumatic incomplete tear of left rotator cuff  -     naproxen (Naprosyn) 500 MG tablet; Take 1 tablet by mouth 2 (Two) Times a Day With Meals.  Dispense: 180 tablet; Refill: 0    3. Mixed hyperlipidemia  -     Comprehensive Metabolic Panel  -     CBC (No Diff)  -     Lipid Panel  -     Hemoglobin A1c    4. Hyperglycemia  -     Comprehensive Metabolic Panel  -     CBC (No Diff)  -     Lipid Panel  -     Hemoglobin A1c    5. Weight loss counseling, encounter for  -     Tirzepatide-Weight Management (ZEPBOUND) 2.5 MG/0.5ML solution auto-injector; Inject 0.5 mL under the skin into the appropriate area as directed 1 (One) Time Per Week.  Dispense: 2 mL; Refill: 3    6. History of MI (myocardial infarction)    7. Need for Tdap vaccination  -     Tdap Vaccine Greater Than or Equal To 6yo IM    8. Chronic nonintractable headache, unspecified headache type  -     Ambulatory Referral to Neurology  -     rizatriptan MLT (MAXALT-MLT) 5 MG disintegrating tablet; Place 1 tablet on  the tongue 1 (One) Time As Needed for Migraine for up to 1 dose. May repeat in 2 hours if needed  Dispense: 9 tablet; Refill: 0    9. Chronic allergic otitis media of both ears    Other orders  -     Fluzone >6mos (0556-5672)       Continue sudafed prn for otitis media

## 2024-11-12 ENCOUNTER — OFFICE VISIT (OUTPATIENT)
Dept: NEUROLOGY | Facility: CLINIC | Age: 59
End: 2024-11-12
Payer: COMMERCIAL

## 2024-11-12 VITALS
OXYGEN SATURATION: 97 % | DIASTOLIC BLOOD PRESSURE: 86 MMHG | WEIGHT: 186 LBS | BODY MASS INDEX: 29.89 KG/M2 | HEART RATE: 76 BPM | TEMPERATURE: 96.9 F | SYSTOLIC BLOOD PRESSURE: 130 MMHG | HEIGHT: 66 IN

## 2024-11-12 DIAGNOSIS — R51.9 NEW ONSET OF HEADACHES AFTER AGE 50: ICD-10-CM

## 2024-11-12 DIAGNOSIS — R51.9 NONINTRACTABLE EPISODIC HEADACHE, UNSPECIFIED HEADACHE TYPE: Primary | ICD-10-CM

## 2024-11-12 RX ORDER — ZAVEGEPANT 10 MG/.1ML
10 SPRAY NASAL AS NEEDED
Qty: 1 EACH | Refills: 0 | COMMUNITY
Start: 2024-11-12 | End: 2024-11-13

## 2024-11-12 RX ORDER — TRETINOIN 0.25 MG/G
CREAM TOPICAL
COMMUNITY
Start: 2024-11-06

## 2024-11-12 RX ORDER — RIMEGEPANT SULFATE 75 MG/75MG
75 TABLET, ORALLY DISINTEGRATING ORAL AS NEEDED
Qty: 2 TABLET | Refills: 0 | COMMUNITY
Start: 2024-11-12

## 2024-11-12 NOTE — PROGRESS NOTES
New Patient Office Visit      Patient Name: Angie Becker  : 1965   MRN: [id number]     Referring Physician: Rebeka Ricketts DO    Chief Complaint:    Chief Complaint   Patient presents with    Headache     Patient reports intermittent headaches; feels symptoms are worse in the winter months; patient reports occasional nausea; denies any sensitivity to light/noise/scent; patient tried and failed Rizatriptan; patient reports symptoms are usually left sided facial and pain on the top of her head        History of Present Illness: Angie Becker is a 59 y.o. female who is here today to establish care with Neurology.  She was referred by ENT for headaches.  She says she has suffered headaches when she was younger and says she has lived with them all her life but more recently on the left side of the face. She says these have become more often and says she has 3-4 per month and may skip a month. She says naproxen helps some. She has been taking OTC medication. She has had them last x 3 days. Sudafed helps some. She says she has not sensitivity to light or noise. They affect the left eye and the top of the head and back behind the ear area.   Denies any tearing, no redness or swelling of the face. Denies any visual changes or disturbance.      Pertinent Medical History: MI, DVT     Subjective      Review of Systems:   Review of Systems   Eyes:  Negative for photophobia and visual disturbance.   Neurological:  Positive for headache.       Past Medical History:   Past Medical History:   Diagnosis Date    Deep vein thrombosis 2018    Headache 2019    Headache, tension-type     Migraine     Myocardial infarction     Scoliosis 2022    Urgent treatment said maybe       Past Surgical History:   Past Surgical History:   Procedure Laterality Date    CARDIAC CATHETERIZATION       SECTION      COLONOSCOPY      TUBAL ABDOMINAL LIGATION  10/21/1999    VASCULAR SURGERY         Family History:  "  Family History   Problem Relation Age of Onset    Diabetes Mother     Cancer Mother         uterine    Hypertension Mother     Diabetes Father     Heart attack Father     Stroke Father     Dementia Father     Breast cancer Neg Hx     Ovarian cancer Neg Hx        Social History:   Social History     Socioeconomic History    Marital status:    Tobacco Use    Smoking status: Never    Smokeless tobacco: Never   Vaping Use    Vaping status: Never Used   Substance and Sexual Activity    Alcohol use: Never     Alcohol/week: 2.0 standard drinks of alcohol     Types: 2 Cans of beer per week     Comment: 1-2 times weekly    Drug use: Never    Sexual activity: Not Currently     Partners: Male     Birth control/protection: Post-menopausal, Tubal ligation       Medications:     Current Outpatient Medications:     MAGNESIUM ASPARTATE PO, Take  by mouth., Disp: , Rfl:     naproxen (Naprosyn) 500 MG tablet, Take 1 tablet by mouth 2 (Two) Times a Day With Meals., Disp: 180 tablet, Rfl: 0    Tirzepatide-Weight Management (ZEPBOUND) 2.5 MG/0.5ML solution auto-injector, Inject 0.5 mL under the skin into the appropriate area as directed 1 (One) Time Per Week., Disp: 2 mL, Rfl: 3    tretinoin (RETIN-A) 0.025 % cream, Please see attached for detailed directions, Disp: , Rfl:     Rimegepant Sulfate (Nurtec) 75 MG tablet dispersible tablet, Take 1 tablet by mouth As Needed (ha)., Disp: 2 tablet, Rfl: 0    Zavegepant HCl (Zavzpret) 10 MG/ACT solution, Administer 10 mg into the nostril(s) as directed by provider As Needed (ha)., Disp: 1 each, Rfl: 0    Allergies:   Allergies   Allergen Reactions    Epinephrine Other (See Comments)     \"heart attack\"       Objective     Physical Exam:  Vital Signs:   Vitals:    11/12/24 0847   BP: 130/86   Pulse: 76   Temp: 96.9 °F (36.1 °C)   SpO2: 97%   Weight: 84.4 kg (186 lb)   Height: 167.6 cm (66\")   PainSc:   2   PainLoc: Head     Body mass index is 30.02 kg/m².     Physical " Exam  Constitutional:       Appearance: Normal appearance.   HENT:      Head: Normocephalic.   Eyes:      General: Lids are normal.      Extraocular Movements: Extraocular movements intact.      Conjunctiva/sclera: Conjunctivae normal.   Pulmonary:      Effort: Pulmonary effort is normal.   Musculoskeletal:         General: Normal range of motion.   Skin:     General: Skin is warm and dry.   Neurological:      General: No focal deficit present.      Mental Status: She is oriented to person, place, and time.      Cranial Nerves: Cranial nerves 2-12 are intact. No dysarthria.      Motor: Motor function is intact. No tremor or pronator drift.      Coordination: Coordination is intact. Romberg sign negative. Finger-Nose-Finger Test normal.      Deep Tendon Reflexes:      Reflex Scores:       Bicep reflexes are 2+ on the right side and 2+ on the left side.       Brachioradialis reflexes are 2+ on the right side and 2+ on the left side.       Patellar reflexes are 2+ on the right side and 2+ on the left side.  Psychiatric:         Attention and Perception: Attention normal.         Mood and Affect: Mood and affect normal.         Speech: Speech normal.         Behavior: Behavior normal. Behavior is cooperative.         Thought Content: Thought content normal.         Cognition and Memory: Cognition normal.         Judgment: Judgment normal.         Neurological Exam  Mental Status  Awake, alert and oriented to person, place and time. Oriented to person, place, and time. Speech is normal. no dysarthria present.    Cranial Nerves  CN III, IV, VI: Extraocular movements intact bilaterally. Normal lids and orbits bilaterally.   Right pupil: 5 mm. Round. Reactive to accommodation.   Left pupil: 5 mm. Round. Reactive to accommodation.  CN V: Facial sensation is normal.  CN VII: Full and symmetric facial movement.  CN IX, X: Palate elevates symmetrically  CN XI: Shoulder shrug strength is normal.  CN XII: Tongue midline without  atrophy or fasciculations.    Motor  Normal muscle bulk throughout. No fasciculations present. Normal muscle tone. No abnormal involuntary movements.                                               Right                     Left  Deltoid                                   5                          5   Biceps                                   5                          5   Triceps                                  5                          5   Quadriceps                           5                          5   Hamstring                             5                          5   Gastrocnemius                     5                           5   Anterior tibialis                      5                          5   Posterior tibialis                    5                          5   Peroneal                               5                          5    Reflexes                                            Right                      Left  Brachioradialis                    2+                         2+  Biceps                                 2+                         2+  Patellar                                2+                         2+    Coordination    Finger-to-nose, rapid alternating movements and heel-to-shin normal bilaterally without dysmetria.No tremor    Gait  Casual gait is normal including stance, stride, and arm swing.Normal toe walking. Normal heel walking. Normal tandem gait. Romberg is absent. Able to rise from chair without using arms.      Assessment / Plan      Assessment/Plan:   Diagnoses and all orders for this visit:    1. Nonintractable episodic headache, unspecified headache type (Primary)  -     MRI Brain With & Without Contrast; Future    2. New onset of headaches after age 50  -     MRI Brain With & Without Contrast; Future       MRI of the brain with and without contrast has been ordered to rule out any demyelinating disease as well as consideration of any structural or vascular abnormality  that may be contributing to her symptoms as well as ruling out any old infarct.  She notes that rizatriptan was not efficacious.  Will discontinue.  Patient has history of MI so we will try samples of Nurtec and if this helps she will notify us back and we will call a prescription in.  Sample of Zavzpret has also been given as patient feels that nasal spray may help better as she says spraying saline tends to give her some relief. Indications and side effects discussed with patient she verbalizes understanding.  Patient will call in the interim if she has any questions or concerns or changes.  -CT of the sinuses from 7/3/2024 was reviewed and noncontributory.  Discussed Sluder's neuralgia with patient as this was discussed with her by ENT.  Although she does have some symptoms with pain being around the nose and eye area, she denies any pain in her jaw or teeth.  She also notes she has pain on the top of her head as well which is not consistent with Sluder's neuralgia.  She denies any swelling, redness, tearing.  Follow Up:   Return in about 10 weeks (around 1/21/2025).    MONIQUE Byrd, FNP-BC  [facility]

## 2024-11-13 DIAGNOSIS — G43.909 ACUTE MIGRAINE: Primary | ICD-10-CM

## 2024-11-13 RX ORDER — RIMEGEPANT SULFATE 75 MG/75MG
75 TABLET, ORALLY DISINTEGRATING ORAL DAILY PRN
Qty: 8 TABLET | Refills: 3 | Status: SHIPPED | OUTPATIENT
Start: 2024-11-13

## 2024-11-18 ENCOUNTER — PATIENT ROUNDING (BHMG ONLY) (OUTPATIENT)
Dept: NEUROLOGY | Facility: CLINIC | Age: 59
End: 2024-11-18
Payer: COMMERCIAL

## 2024-12-14 ENCOUNTER — HOSPITAL ENCOUNTER (OUTPATIENT)
Facility: HOSPITAL | Age: 59
Discharge: HOME | End: 2024-12-14
Payer: COMMERCIAL

## 2024-12-14 DIAGNOSIS — R51.9 NEW ONSET OF HEADACHES AFTER AGE 50: ICD-10-CM

## 2024-12-14 DIAGNOSIS — R51.9 NONINTRACTABLE EPISODIC HEADACHE, UNSPECIFIED HEADACHE TYPE: ICD-10-CM

## 2024-12-14 PROCEDURE — A9577 INJ MULTIHANCE: HCPCS | Performed by: NURSE PRACTITIONER

## 2024-12-14 PROCEDURE — 25510000002 GADOBENATE DIMEGLUMINE 529 MG/ML SOLUTION: Performed by: NURSE PRACTITIONER

## 2024-12-14 PROCEDURE — 70553 MRI BRAIN STEM W/O & W/DYE: CPT

## 2024-12-14 RX ADMIN — GADOBENATE DIMEGLUMINE 15 ML: 529 INJECTION, SOLUTION INTRAVENOUS at 10:23

## 2024-12-23 ENCOUNTER — OFFICE VISIT (OUTPATIENT)
Dept: INTERNAL MEDICINE | Facility: CLINIC | Age: 59
End: 2024-12-23
Payer: COMMERCIAL

## 2024-12-23 VITALS
HEIGHT: 66 IN | DIASTOLIC BLOOD PRESSURE: 86 MMHG | SYSTOLIC BLOOD PRESSURE: 124 MMHG | WEIGHT: 189 LBS | OXYGEN SATURATION: 98 % | HEART RATE: 81 BPM | TEMPERATURE: 98.4 F | RESPIRATION RATE: 20 BRPM | BODY MASS INDEX: 30.37 KG/M2

## 2024-12-23 DIAGNOSIS — R05.9 COUGH, UNSPECIFIED TYPE: Primary | ICD-10-CM

## 2024-12-23 DIAGNOSIS — U07.1 COVID-19 VIRUS INFECTION: ICD-10-CM

## 2024-12-23 LAB
EXPIRATION DATE: ABNORMAL
FLUAV AG UPPER RESP QL IA.RAPID: NOT DETECTED
FLUBV AG UPPER RESP QL IA.RAPID: NOT DETECTED
INTERNAL CONTROL: ABNORMAL
Lab: ABNORMAL
SARS-COV-2 AG UPPER RESP QL IA.RAPID: DETECTED

## 2024-12-23 PROCEDURE — 99213 OFFICE O/P EST LOW 20 MIN: CPT | Performed by: STUDENT IN AN ORGANIZED HEALTH CARE EDUCATION/TRAINING PROGRAM

## 2024-12-23 PROCEDURE — 87428 SARSCOV & INF VIR A&B AG IA: CPT | Performed by: STUDENT IN AN ORGANIZED HEALTH CARE EDUCATION/TRAINING PROGRAM

## 2024-12-23 RX ORDER — DEXTROMETHORPHAN HYDROBROMIDE AND PROMETHAZINE HYDROCHLORIDE 15; 6.25 MG/5ML; MG/5ML
5 SYRUP ORAL 4 TIMES DAILY PRN
Qty: 240 ML | Refills: 0 | Status: SHIPPED | OUTPATIENT
Start: 2024-12-23

## 2024-12-24 NOTE — PROGRESS NOTES
Subjective   Angie Becker is a 59 y.o. female.     History of Present Illness  Patient presents for nasal congestion, bilateral ear fullness, cough that started 4 days ago      The following portions of the patient's history were reviewed and updated as appropriate: allergies, current medications, past family history, past medical history, past social history, past surgical history, and problem list.    Review of Systems   All other systems reviewed and are negative.      Objective   Physical Exam  Vitals and nursing note reviewed.   Constitutional:       Appearance: Normal appearance.   HENT:      Head: Normocephalic and atraumatic.      Right Ear: External ear normal.      Left Ear: External ear normal.      Nose: Nose normal.      Mouth/Throat:      Mouth: Mucous membranes are moist.      Pharynx: Oropharynx is clear. No oropharyngeal exudate or posterior oropharyngeal erythema.   Eyes:      Extraocular Movements: Extraocular movements intact.      Conjunctiva/sclera: Conjunctivae normal.      Pupils: Pupils are equal, round, and reactive to light.   Cardiovascular:      Rate and Rhythm: Normal rate and regular rhythm.      Pulses: Normal pulses.      Heart sounds: Normal heart sounds.   Pulmonary:      Effort: Pulmonary effort is normal.      Breath sounds: Normal breath sounds.   Abdominal:      General: Abdomen is flat. Bowel sounds are normal.      Palpations: Abdomen is soft.   Musculoskeletal:         General: Normal range of motion.      Cervical back: Normal range of motion.   Skin:     General: Skin is warm.      Capillary Refill: Capillary refill takes less than 2 seconds.   Neurological:      General: No focal deficit present.      Mental Status: She is alert and oriented to person, place, and time. Mental status is at baseline.   Psychiatric:         Mood and Affect: Mood normal.         Behavior: Behavior normal.         Thought Content: Thought content normal.         Judgment: Judgment  normal.         Assessment & Plan   Diagnoses and all orders for this visit:    1. Cough, unspecified type (Primary)  -     POCT SARS-CoV-2 Antigen JAVIER + Flu    2. COVID-19 virus infection  -     promethazine-dextromethorphan (PROMETHAZINE-DM) 6.25-15 MG/5ML syrup; Take 5 mL by mouth 4 (Four) Times a Day As Needed for Cough.  Dispense: 240 mL; Refill: 0           Counseled on symptomatic management and contagion window

## 2025-01-15 ENCOUNTER — PATIENT MESSAGE (OUTPATIENT)
Dept: INTERNAL MEDICINE | Facility: CLINIC | Age: 60
End: 2025-01-15
Payer: COMMERCIAL

## 2025-01-17 ENCOUNTER — PRIOR AUTHORIZATION (OUTPATIENT)
Dept: INTERNAL MEDICINE | Facility: CLINIC | Age: 60
End: 2025-01-17
Payer: COMMERCIAL

## 2025-01-17 NOTE — TELEPHONE ENCOUNTER
[facility] (Key: [id number])  PA Case ID #: [id number]  Need Help? Call us at [phone]  Status  sent iconSent to Plan today  Drug  Zepbound 2.5MG/0.5ML pen-injectors  ePA cloud logo  Form  [employer] Electronic PA Form (2017 [employer])

## 2025-01-21 NOTE — TELEPHONE ENCOUNTER
[facility] (Key: [id number])  PA Case ID #: [id number]  Need Help? Call us at [phone]  Outcome  Denied today by [employer] 2017  CaseId:[id number];Status:Denied;Review Type:Prior Auth;Appeal Information: Attention:ATTN: CLINICAL APPEALS DEPARTMENT [facility] [address] [address],[place], [place],[address] Phone:[phone] Fax:[phone];;CaseId:[id number];Status:Denied;Review Type:Prior Auth;Appeal Information: Attention:ATTN: CLINICAL APPEALS DEPARTMENT [facility] [address] [address],[place], [place],[address] Phone:[phone] Fax:[phone]; Important - Please read the below note on eAppeals: Please reference the denial letter for information on the rights for an appeal, rationale for the denial, and how to submit an appeal including if any information is needed to support the appeal. Note about urgent situations - Generally, an urgent situation is one which, in the opinion of the provider, the health of the patient may be in serious jeopardy or may experience pain that cannot be adequately controlled while waiting for a decision on the appeal.;  Drug  Zepbound 2.5MG/0.5ML pen-injectors  ePA cloud logo  Form  [employer] Electronic PA Form (2017 NCPDP)    WILL APPEAL.

## 2025-01-22 ENCOUNTER — TELEPHONE (OUTPATIENT)
Dept: INTERNAL MEDICINE | Facility: CLINIC | Age: 60
End: 2025-01-22
Payer: COMMERCIAL

## 2025-01-22 NOTE — TELEPHONE ENCOUNTER
Pharmacy Name:  [facility] CLINICAL APPEALS DEPT.    Reference Number (if applicable):  CASE # [id number]     Pharmacy representative name: JALEN    Pharmacy representative phone number:  [phone]     What medication are you calling in regards to:  Tirzepatide-Weight Management (ZEPBOUND) 2.5 MG/0.5ML solution auto-injector     What question does the pharmacy have: SEVERAL CLINICAL QUESTIONS    Who is the provider that prescribed the medication: DR. CASTRO    Additional notes: PLEASE CALL

## 2025-01-24 ENCOUNTER — OFFICE VISIT (OUTPATIENT)
Dept: NEUROLOGY | Facility: CLINIC | Age: 60
End: 2025-01-24
Payer: COMMERCIAL

## 2025-01-24 VITALS
DIASTOLIC BLOOD PRESSURE: 88 MMHG | RESPIRATION RATE: 14 BRPM | HEIGHT: 66 IN | HEART RATE: 72 BPM | TEMPERATURE: 98.2 F | SYSTOLIC BLOOD PRESSURE: 136 MMHG | BODY MASS INDEX: 29.92 KG/M2 | OXYGEN SATURATION: 99 % | WEIGHT: 186.2 LBS

## 2025-01-24 DIAGNOSIS — G43.909 ACUTE MIGRAINE: ICD-10-CM

## 2025-01-24 NOTE — PROGRESS NOTES
"     New Patient Office Visit      Patient Name: Angie Becker  : 1965   MRN: [id number]     Referring Physician: No ref. provider found    Chief Complaint:    Chief Complaint   Patient presents with    Follow-up     Migraine; pt reports 10/30 HA days    Results     MRI Brain       History of Present Illness: Angie Becker is a 59 y.o. female who is here today to establish care with Neurology.  She was referred by ENT for headaches. She continues to have HA's in the top of the head and left eye and at times she feels like the top of the head will \"blow off\". She has tingling on the head with her headaches but no other times. No facila swelling or tearing or redness of her eye/face.  She denies having any photophobia or phonophobia.  She says within an hour the Nurtec is helping.     -MRI of the brain with and without contrast on 2024: FIndings:  No acute infarct is present on diffusion weighted sequences. Midline structures appear normal and the craniocervical junction is satisfactory. Areas of supratentorial as well as pontine T2 hyperintensity are present, including 2-3 findings which appeared   juxtacortical, for example along the paramedian left frontal lobe on image 21 of series 9. There is no abnormal enhancement. There is otherwise no evidence of unexpected hemorrhage, mass or mass effect. The ventricles are normal in size and   configuration. The orbits are normal. The paranasal sinuses appear clear.     IMPRESSION:  Impression:  Nonspecific areas of white matter signal abnormality are present, likely benign, and in a patient of this age with a history of migraines somewhat equally likely to reflect migrainous sequela, early microvascular change or potentially demyelinating   lesions, although a new diagnosis of multiple sclerosis would be somewhat atypical in a patient of this age. No additional acute or suspicious findings are present. Consider neurology consultation if not " already obtained.        Pertinent Medical History: MI, DVT     Subjective      Review of Systems:   Review of Systems   Neurological:  Positive for headache.       Past Medical History:   Past Medical History:   Diagnosis Date    Deep vein thrombosis 2018    Headache 2019    Headache, tension-type     Migraine     Myocardial infarction     Scoliosis 2022    Urgent treatment said maybe       Past Surgical History:   Past Surgical History:   Procedure Laterality Date    CARDIAC CATHETERIZATION       SECTION      COLONOSCOPY      TUBAL ABDOMINAL LIGATION  10/21/1999    VASCULAR SURGERY         Family History:   Family History   Problem Relation Age of Onset    Diabetes Mother     Cancer Mother         uterine    Hypertension Mother     Diabetes Father     Heart attack Father     Stroke Father     Dementia Father     Breast cancer Neg Hx     Ovarian cancer Neg Hx        Social History:   Social History     Socioeconomic History    Marital status:    Tobacco Use    Smoking status: Never    Smokeless tobacco: Never   Vaping Use    Vaping status: Never Used   Substance and Sexual Activity    Alcohol use: Never     Alcohol/week: 2.0 standard drinks of alcohol     Types: 2 Cans of beer per week     Comment: 1-2 times weekly    Drug use: Never    Sexual activity: Not Currently     Partners: Male     Birth control/protection: Post-menopausal, Tubal ligation       Medications:     Current Outpatient Medications:     MAGNESIUM ASPARTATE PO, Take  by mouth., Disp: , Rfl:     naproxen (Naprosyn) 500 MG tablet, Take 1 tablet by mouth 2 (Two) Times a Day With Meals., Disp: 180 tablet, Rfl: 0    rimegepant sulfate (Nurtec) 75 MG tablet, Take 1 tablet by mouth Daily As Needed (acute migraine)., Disp: 8 tablet, Rfl: 4    tretinoin (RETIN-A) 0.025 % cream, Please see attached for detailed directions, Disp: , Rfl:     promethazine-dextromethorphan (PROMETHAZINE-DM) 6.25-15 MG/5ML syrup, Take 5 mL by mouth 4 (Four) Times  "a Day As Needed for Cough. (Patient not taking: Reported on 1/24/2025), Disp: 240 mL, Rfl: 0    Tirzepatide-Weight Management (ZEPBOUND) 2.5 MG/0.5ML solution auto-injector, Inject 0.5 mL under the skin into the appropriate area as directed 1 (One) Time Per Week. (Patient not taking: Reported on 1/24/2025), Disp: 2 mL, Rfl: 3    Allergies:   Allergies   Allergen Reactions    Epinephrine Other (See Comments)     \"heart attack\"       Objective     Physical Exam:  Vital Signs:   Vitals:    01/24/25 0833   BP: 136/88   BP Location: Right arm   Patient Position: Sitting   Cuff Size: Adult   Pulse: 72   Resp: 14   Temp: 98.2 °F (36.8 °C)   TempSrc: Infrared   SpO2: 99%   Weight: 84.5 kg (186 lb 3.2 oz)   Height: 167.6 cm (65.98\")   PainSc: 0-No pain     Body mass index is 30.07 kg/m².     Physical Exam  Constitutional:       General: She is awake.      Appearance: Normal appearance.   HENT:      Head: Normocephalic.   Eyes:      General: Lids are normal.      Extraocular Movements: Extraocular movements intact.      Conjunctiva/sclera: Conjunctivae normal.   Pulmonary:      Effort: Pulmonary effort is normal.   Musculoskeletal:         General: Normal range of motion.   Skin:     General: Skin is warm and dry.   Neurological:      General: No focal deficit present.      Mental Status: She is alert and oriented to person, place, and time.      Cranial Nerves: Cranial nerves 2-12 are intact. No dysarthria.      Motor: Motor function is intact.      Coordination: Finger-Nose-Finger Test normal.      Gait: Gait is intact.   Psychiatric:         Attention and Perception: Attention normal.         Mood and Affect: Mood and affect normal.         Speech: Speech normal.         Behavior: Behavior normal. Behavior is cooperative.         Thought Content: Thought content normal.         Cognition and Memory: Cognition normal.         Judgment: Judgment normal.         Neurological Exam  Mental Status  Awake and alert. Oriented to " person, place, time and situation. Oriented to person, place, and time. Speech is normal. no dysarthria present. Language is fluent with no aphasia. Attention and concentration are normal.    Cranial Nerves  CN III, IV, VI: Extraocular movements intact bilaterally. Normal lids and orbits bilaterally.  CN V: Facial sensation is normal.  CN VII: Full and symmetric facial movement.  CN IX, X: Palate elevates symmetrically  CN XI: Shoulder shrug strength is normal.  CN XII: Tongue midline without atrophy or fasciculations.    Motor  Normal muscle bulk throughout. No fasciculations present. Normal muscle tone. No abnormal involuntary movements.    Coordination  Right: Finger-to-nose normal.    Gait  Casual gait is normal including stance, stride, and arm swing. Normal gait. Able to rise from chair without using arms.      Assessment / Plan      Assessment/Plan:   Diagnoses and all orders for this visit:    1. Acute migraine  -     rimegepant sulfate (Nurtec) 75 MG tablet; Take 1 tablet by mouth Daily As Needed (acute migraine).  Dispense: 8 tablet; Refill: 4       This is a pleasant 59-year-old patient here to follow-up with headaches and on her MRI results.  Long discussion with patient regarding her MRI results.  We will repeat her MRI in 6 months to check for stability.  She will continue with Nurtec as needed as this has been working well for her headaches and aborting them within an hour.  She denies any new symptoms.  No tingling or numbness of her extremities or weakness.     Patient will call in the interim if she has any questions or concerns or changes.    Follow Up:   Return in about 4 months (around 5/24/2025).    MONIQUE Byrd, FNP-BC  [facility]

## 2025-01-29 ENCOUNTER — TELEPHONE (OUTPATIENT)
Dept: INTERNAL MEDICINE | Facility: CLINIC | Age: 60
End: 2025-01-29
Payer: COMMERCIAL

## 2025-01-29 NOTE — TELEPHONE ENCOUNTER
Leobardo was following up on Appeal for Re-initiation of Zepbound 2.5 mg  due to patient has been with out the medication for 1 month.

## 2025-01-30 ENCOUNTER — TELEPHONE (OUTPATIENT)
Dept: INTERNAL MEDICINE | Facility: CLINIC | Age: 60
End: 2025-01-30
Payer: COMMERCIAL

## 2025-03-24 DIAGNOSIS — G43.909 ACUTE MIGRAINE: ICD-10-CM

## 2025-05-20 ENCOUNTER — PATIENT MESSAGE (OUTPATIENT)
Dept: INTERNAL MEDICINE | Facility: CLINIC | Age: 60
End: 2025-05-20
Payer: COMMERCIAL

## 2025-05-20 RX ORDER — TRETINOIN 0.25 MG/G
CREAM TOPICAL
OUTPATIENT
Start: 2025-05-20

## 2025-07-28 RX ORDER — TIRZEPATIDE 5 MG/.5ML
5 INJECTION, SOLUTION SUBCUTANEOUS WEEKLY
Qty: 2 ML | Refills: 0 | Status: SHIPPED | OUTPATIENT
Start: 2025-07-28

## 2025-08-06 ENCOUNTER — TRANSCRIBE ORDERS (OUTPATIENT)
Dept: ADMINISTRATIVE | Facility: HOSPITAL | Age: 60
End: 2025-08-06
Payer: COMMERCIAL

## 2025-08-06 DIAGNOSIS — Z12.31 ENCOUNTER FOR SCREENING MAMMOGRAM FOR BREAST CANCER: Primary | ICD-10-CM
